# Patient Record
Sex: MALE
[De-identification: names, ages, dates, MRNs, and addresses within clinical notes are randomized per-mention and may not be internally consistent; named-entity substitution may affect disease eponyms.]

---

## 2019-11-27 ENCOUNTER — HOSPITAL ENCOUNTER (OUTPATIENT)
Dept: HOSPITAL 92 - BICULT | Age: 64
Discharge: HOME | End: 2019-11-27
Attending: FAMILY MEDICINE
Payer: MEDICARE

## 2019-11-27 DIAGNOSIS — Z13.6: Primary | ICD-10-CM

## 2019-11-27 PROCEDURE — 76775 US EXAM ABDO BACK WALL LIM: CPT

## 2019-11-27 NOTE — ULT
US Abdominal Aorta: 11/27/2019 12:00 AM



CLINICAL HISTORY: Abdominal bruit.; AAA screening



STUDY: Limited abdominal ultrasound of the aorta.



TECHNIQUE: A limited ultrasound of the abdominal aorta was performed. Spectral analysis of the Dopple
r waveform was performed.



COMPARISON: None.                  



FINDINGS:



The aorta is normal in caliber without evidence of aneurysmal dilatation and measures 1.9 cm in great
est dimension.



The common iliac arteries are normal in caliber.





IMPRESSION:



No evidence of abdominal aortic aneurysm.



Reported By: Tristan Cifuentes 

Electronically Signed:  11/27/2019 7:51 AM